# Patient Record
Sex: MALE | Race: WHITE | URBAN - METROPOLITAN AREA
[De-identification: names, ages, dates, MRNs, and addresses within clinical notes are randomized per-mention and may not be internally consistent; named-entity substitution may affect disease eponyms.]

---

## 2017-01-01 ENCOUNTER — APPOINTMENT (OUTPATIENT)
Dept: CT IMAGING | Facility: CLINIC | Age: 47
DRG: 637 | End: 2017-01-01
Attending: STUDENT IN AN ORGANIZED HEALTH CARE EDUCATION/TRAINING PROGRAM
Payer: COMMERCIAL

## 2017-01-01 PROCEDURE — 70491 CT SOFT TISSUE NECK W/DYE: CPT

## 2017-01-02 ENCOUNTER — APPOINTMENT (OUTPATIENT)
Dept: OCCUPATIONAL THERAPY | Facility: CLINIC | Age: 47
DRG: 637 | End: 2017-01-02
Attending: SURGERY
Payer: COMMERCIAL

## 2017-01-02 PROBLEM — F60.3 BORDERLINE PERSONALITY DISORDER (H): Chronic | Status: ACTIVE | Noted: 2017-01-02

## 2017-01-03 ENCOUNTER — CARE COORDINATION (OUTPATIENT)
Dept: CARE COORDINATION | Facility: CLINIC | Age: 47
End: 2017-01-03

## 2017-01-03 NOTE — PROGRESS NOTES
"[facility]  \"Hello, my name is Esmer Bernstein , and I am calling from the [facility].  I want to check in and see how you are doing, after leaving the hospital.  You will also receive a call from your Care Coordinator (care team), but I want to make sure you don t have any urgent needs.  I have a couple questions to review with you:     Post-Discharge Outreach                                                    José Antonio Joseph is a 46 year old male     Date of Admission:                    12/30/2016  Date of Discharge:                    1/2/2017    Admitting Physician:                  Simon Marino MD  Discharge Physician:                 Stefania Farfan MD  Discharging Service:                  Internal Medicine, [place] [address]     Primary Provider: Patrick Acosta           Reason for Admission:    José Antonio Joseph is a 46 year old male who was admited with DKA (glucose of 912,  Na 130, K 5.2, CL 90, CO2 13 with AG of 27, BUN 37 abd Creat 2.23 lactic acid 8.0 Beta hydroxbuytrate >60).           Discharge Diagnosis:    DKA from IDDM2 w/ insulin pump  NELDA  GAS pharyngitis  Dysphagia with globus sensation  Chronic erosive esophagitis  GERD  Demand ischemia/NSTEMI  Ischemic cardiomyopathy  CAD s/p PCI  HTN  HLD    Resolved Conditions:  DKA  Lactic acidosis  NELDA            Follow-up Appointments      Adult [facility] Follow-up and recommended labs and tests        Follow up with primary care provider, Patrick Acosta, within 7 days to evaluate medication change and for hospital follow- up.  The following labs/tests are recommended: CBC, BMP.   Patient may benefit from ENT and gastroenterology follow up to be guided by primary care physician.                  Care Team:    Patient Care Team       Relationship Specialty Notifications Start End    Patrick Acosta MD PCP - General   3/10/09     Phone: [phone] Fax: [phone]         [facility] [address] [place] [address]            Transition of Care Review                                                      Patient was called three times and no answer so post 24 hr DC follow up calls will be closed out          Plan                                                      Thanks for your time.  Your Care Coordinator will follow-up within the next couple days.  In the meantime if you have questions, concerns or problems call your care team.        Esmer Bernstein